# Patient Record
Sex: FEMALE | Race: WHITE
[De-identification: names, ages, dates, MRNs, and addresses within clinical notes are randomized per-mention and may not be internally consistent; named-entity substitution may affect disease eponyms.]

---

## 2018-07-31 ENCOUNTER — HOSPITAL ENCOUNTER (EMERGENCY)
Dept: HOSPITAL 42 - ED | Age: 38
Discharge: HOME | End: 2018-07-31
Payer: SELF-PAY

## 2018-07-31 VITALS — RESPIRATION RATE: 18 BRPM | SYSTOLIC BLOOD PRESSURE: 104 MMHG | TEMPERATURE: 98.1 F | DIASTOLIC BLOOD PRESSURE: 72 MMHG

## 2018-07-31 VITALS — HEART RATE: 96 BPM | OXYGEN SATURATION: 97 %

## 2018-07-31 VITALS — BODY MASS INDEX: 23.4 KG/M2

## 2018-07-31 DIAGNOSIS — R23.4: Primary | ICD-10-CM

## 2018-07-31 NOTE — ED PDOC
Arrival/HPI





- General


Chief Complaint: Breast Problem


Time Seen by Provider: 18 12:55


Historian: Patient





- History of Present Illness


Narrative History of Present Illness (Text): 


18 13:10


A 37 year old female, with no significant past medical history, presents to the 

emergency department complaining of itchy rash to both nipples. Patient reports 

she has been breastfeeding here 13 day  (also here as a patient for rash 

to mouth) most of the day daily. Patient denies other complaints at this time.





No PMD








Past Medical History





- Provider Review


Nursing Documentation Reviewed: Yes





- Infectious Disease


Hx of Infectious Diseases: None





- Pulmonary


Hx Asthma: Yes





- Psychiatric


Hx Substance Use: No





- Surgical History


Hx Appendectomy: Yes





Family/Social History





- Physician Review


Nursing Documentation Reviewed: Yes


Family/Social History: No Known Family HX


Smoking Status: Never Smoked


Hx Alcohol Use: No


Hx Substance Use: No





Allergies/Home Meds


Allergies/Adverse Reactions: 


Allergies





No Known Allergies Allergy (Verified 18 12:27)


 











Review of Systems





- Physician Review


All systems were reviewed & negative as marked: Yes





- Review of Systems


Constitutional: absent: Fevers


Skin: Rash (bilateral breast nipples)





Physical Exam





- Physical Exam


Narrative Physical Exam (Text): 





Constitutional: No acute distress.


Head: Normocephalic.  Atraumatic.  


Eyes:  PERRL.


ENT:  Moist mucous membranes.


Neck:  Supple.


Cardiovascular:  Regular rate.


Chest: No tenderness.


Respiratory:  Clear to auscultation bilaterally.


GI:  Soft. Nontender. Nondistended. 


Back:  No CVA tenderness.


Musculoskeletal:  No tenderness or swelling of extremities.


Skin:  Bilateral areola erythema with cracked skin and yellow crusting, soft, 

no swelling/tenderness, no abscess, no abnormal discharge.


Neurologic:  Alert, no focal deficit.





Vital Signs Reviewed: Yes


Vital Signs











  Temp Pulse Resp BP Pulse Ox


 


 18 13:15  98.1 F  96 H  18  104/72  97


 


 18 12:31  98.1 F  93 H  18  104/72  96











Temperature: Afebrile


Blood Pressure: Normal


Pulse: Regular


Respiratory Rate: Normal


Appearance: Positive for: Well-Appearing, Non-Toxic, Comfortable


Pain Distress: None


Mental Status: Positive for: Alert and Oriented X 3





Medical Decision Making


ED Course and Treatment: 


18 13:11


Impression: 37 year old female with bilateral breast nipple rash. Physical exam 

shows bilateral areola erythema with cracked skin and yellow crusting, soft, no 

swelling/tenderness, no abscess, no abnormal discharge.





Plan:


-- Clotrimazole topical and mupirocin for common super infection. F/u PMD, 

instructed on cleaning before and after feeds.





- Scribe Statement


The provider has reviewed the documentation as recorded by the [facility]





Daniel Merino





Provider Scribe Provider Scribe Attestation:


All medical record entries made by the Scribe were at my direction and 

personally dictated by me. I have reviewed the chart and agree that the record 

accurately reflects my personal performance of the history, physical exam, 

medical decision making, and the department course for this patient. I have 

also personally directed, reviewed, and agree with the discharge instructions 

and disposition.








Disposition/Present on Arrival





- Present on Arrival


Any Indicators Present on Arrival: No


History of DVT/PE: No


History of Uncontrolled Diabetes: No


Urinary Catheter: No


History of Decub. Ulcer: No


History Surgical Site Infection Following: None





- Disposition


Have Diagnosis and Disposition been Completed?: Yes


Diagnosis: 


 Nipple crusting





Disposition: HOME/ ROUTINE


Disposition Time: 13:01


Patient Plan: Discharge


Condition: STABLE


Discharge Instructions (ExitCare):  Thrush


Prescriptions: 


Clotrimazole 1% Cream [Lotrimin 1%] 1 applic TOP BID #1 tube


Mupirocin 2% Cream [Bactroban Cream] 1 applic TOP TID #1 tube


Forms:  [facility] (English)